# Patient Record
Sex: MALE | Race: WHITE | NOT HISPANIC OR LATINO
[De-identification: names, ages, dates, MRNs, and addresses within clinical notes are randomized per-mention and may not be internally consistent; named-entity substitution may affect disease eponyms.]

---

## 2018-02-26 ENCOUNTER — TRANSCRIPTION ENCOUNTER (OUTPATIENT)
Age: 43
End: 2018-02-26

## 2018-08-30 ENCOUNTER — TRANSCRIPTION ENCOUNTER (OUTPATIENT)
Age: 43
End: 2018-08-30

## 2021-08-29 ENCOUNTER — EMERGENCY (EMERGENCY)
Facility: HOSPITAL | Age: 46
LOS: 0 days | Discharge: HOME | End: 2021-08-29
Attending: EMERGENCY MEDICINE | Admitting: EMERGENCY MEDICINE
Payer: COMMERCIAL

## 2021-08-29 VITALS
DIASTOLIC BLOOD PRESSURE: 71 MMHG | OXYGEN SATURATION: 98 % | RESPIRATION RATE: 16 BRPM | HEART RATE: 102 BPM | SYSTOLIC BLOOD PRESSURE: 107 MMHG | TEMPERATURE: 102 F

## 2021-08-29 DIAGNOSIS — U07.1 COVID-19: ICD-10-CM

## 2021-08-29 DIAGNOSIS — R06.02 SHORTNESS OF BREATH: ICD-10-CM

## 2021-08-29 DIAGNOSIS — R19.7 DIARRHEA, UNSPECIFIED: ICD-10-CM

## 2021-08-29 PROCEDURE — 99284 EMERGENCY DEPT VISIT MOD MDM: CPT

## 2021-08-29 PROCEDURE — 71045 X-RAY EXAM CHEST 1 VIEW: CPT | Mod: 26

## 2021-08-29 RX ORDER — ACETAMINOPHEN 500 MG
975 TABLET ORAL ONCE
Refills: 0 | Status: COMPLETED | OUTPATIENT
Start: 2021-08-29 | End: 2021-08-29

## 2021-08-29 RX ORDER — DEXAMETHASONE 0.5 MG/5ML
6 ELIXIR ORAL ONCE
Refills: 0 | Status: COMPLETED | OUTPATIENT
Start: 2021-08-29 | End: 2021-08-29

## 2021-08-29 RX ADMIN — Medication 975 MILLIGRAM(S): at 16:42

## 2021-08-29 RX ADMIN — Medication 6 MILLIGRAM(S): at 17:52

## 2021-08-29 NOTE — ED PROVIDER NOTE - PATIENT PORTAL LINK FT
You can access the FollowMyHealth Patient Portal offered by [facility] by registering at the following website: [email or web address]. By joining [employer]’s FollowMyHealth portal, you will also be able to view your health information using other applications (apps) compatible with our system.

## 2021-08-29 NOTE — ED PROVIDER NOTE - CLINICAL SUMMARY MEDICAL DECISION MAKING FREE TEXT BOX
CXR consistent with COVID infection. O2 sat maintained at rest and with ambulation. Pt to continue supportive care at home, Monitor oxygen saturation with Pulse oximeter.  Strict return precautions discussed. Pt verbalizes understanding

## 2021-08-29 NOTE — ED PROVIDER NOTE - PHYSICAL EXAMINATION
CONSTITUTIONAL: Well-developed; well-nourished; in no acute distress.   SKIN: warm, dry  HEAD: Normocephalic; atraumatic.  EYES: no conjunctival injection. PERRLA. EOMI.   ENT: No nasal discharge  NECK: Supple  CARD: S1, S2 normal; no murmurs, gallops, or rubs. Regular rate and rhythm.   RESP: No wheezes, rales or rhonchi. Not tachypneic. No increased respiratory effort. Speaking full sentences.  ABD: soft ntnd. BS+ in all 4 quadrants.  EXT: Normal ROM.  No clubbing, cyanosis or edema.   NEURO: Alert, oriented, grossly unremarkable.  PSYCH: Cooperative, appropriate.

## 2021-08-29 NOTE — ED PROVIDER NOTE - CARE PROVIDER_API CALL
James Arguelles  [address] [address]  Phone: [phone]  Fax: [phone]  Follow Up Time: 1-3 Days

## 2021-08-29 NOTE — ED ADULT TRIAGE NOTE - CHIEF COMPLAINT QUOTE
Came in for fever and SOB and fever. COVID positive on August 20th. Pt. states: "I have Covid Pneumonia."

## 2021-08-29 NOTE — ED PROVIDER NOTE - ATTENDING CONTRIBUTION TO CARE
45 yo M presents with fevers on and off last 10 days, + body aches and cough, + SOB.  Pt was seen at [facility] today and told he has pneumonia.  Tested positive for COVID on 8/20.  Pt is not vaccinated, no CP, On exam pt in NAD AAO x 3, non toxic, OP clear, MMM, Lungs cta b/l no wrr, no edema,

## 2021-08-29 NOTE — ED PROVIDER NOTE - OBJECTIVE STATEMENT
47 yo male, no PMHx, presents with shortness of breath, constant since being diagnosed with Covid 8/20, worse with exertion, no alleviating factors, associated with body aches, intermittent diarrhea, and fevers Tmax 103 F, last dose ibuprofen 1 hour prior (600 mg). Patient is concerned because he still has a fever. Tolerating PO. Denies chest pain, abdominal pain, dizziness, headache, nausea, vomiting. 47 yo male, no PMHx, presents with shortness of breath, intermittent, since being diagnosed with Covid 8/20, worse with exertion, no alleviating factors, associated with body aches, intermittent diarrhea, and fevers Tmax 103 F, last dose ibuprofen 1 hour prior (600 mg). Patient is concerned because he still has a fever. Tolerating PO. Denies chest pain, abdominal pain, dizziness, headache, nausea, vomiting.

## 2021-08-29 NOTE — ED PROVIDER NOTE - NS ED ROS FT
GEN:  +fever, +chills, no generalized weakness  NEURO:  no headache, no dizziness  ENT: no sore throat, no runny nose  CV:  no chest pain, no palpitations  RESP:  +sob, no cough  GI:  no nausea, no vomiting, no abdominal pain, +diarrhea  :  no dysuria, no urinary frequency, no hematuria  MSK:  +body aches  SKIN:  no rash, no bruising

## 2021-08-29 NOTE — ED ADULT NURSE NOTE - CHPI ED NUR SYMPTOMS POS
[FreeTextEntry3] : All medical record entries made by the scribe were at my, Dr. Selam Torres's direction and personally dictated by me on 03/27/2019  9:45AM. I have reviewed the chart and agree that the record accurately reflects my personal performance of the history, physical exam, assessment and plan. I have also personally directed, reviewed, and agreed with the chart.\par  FEVER

## 2021-08-31 ENCOUNTER — EMERGENCY (EMERGENCY)
Facility: HOSPITAL | Age: 46
LOS: 0 days | Discharge: HOME | End: 2021-08-31
Attending: EMERGENCY MEDICINE | Admitting: EMERGENCY MEDICINE
Payer: COMMERCIAL

## 2021-08-31 VITALS — OXYGEN SATURATION: 95 %

## 2021-08-31 VITALS
TEMPERATURE: 98 F | SYSTOLIC BLOOD PRESSURE: 112 MMHG | OXYGEN SATURATION: 96 % | DIASTOLIC BLOOD PRESSURE: 78 MMHG | WEIGHT: 279.99 LBS | RESPIRATION RATE: 20 BRPM | HEART RATE: 99 BPM

## 2021-08-31 DIAGNOSIS — R50.9 FEVER, UNSPECIFIED: ICD-10-CM

## 2021-08-31 DIAGNOSIS — R06.02 SHORTNESS OF BREATH: ICD-10-CM

## 2021-08-31 DIAGNOSIS — U07.1 COVID-19: ICD-10-CM

## 2021-08-31 DIAGNOSIS — R05 COUGH: ICD-10-CM

## 2021-08-31 PROCEDURE — 99284 EMERGENCY DEPT VISIT MOD MDM: CPT

## 2021-08-31 RX ORDER — ALBUTEROL 90 UG/1
2 AEROSOL, METERED ORAL
Qty: 1 | Refills: 0
Start: 2021-08-31 | End: 2021-09-29

## 2021-08-31 NOTE — ED PROVIDER NOTE - OBJECTIVE STATEMENT
46 year old male, no past medical history, +COVID 8/20, who presents with shortness of breath. patient recently evaluated in ED 8/28 for similar, has been checking pulse ox @ home, O2 sat 95-97% ORA. patient reports persistent SOB prompting presentation to ed. reports fever, body aches, myalgias, cough and diarrhea. denies chest pain, hemoptysis, abd pain, back pain, leg pain/swelling. patient not vaccinated.

## 2021-08-31 NOTE — ED PROVIDER NOTE - NS ED ROS FT
Review of Systems:  	•	CONSTITUTIONAL: +fever  	•	SKIN: no rash  	•	ENT: no sore throat   	•	RESPIRATORY: +shortness of breath, +cough  	•	CARDIAC: no chest pain, no palpitations  	•	GI: no abd pain, no nausea, no vomiting, +diarrhea.   	•	GENITO-URINARY: no discharge, no dysuria; no hematuria, no increased urinary frequency  	•	MUSCULOSKELETAL: no joint paint, no swelling, no redness  	•	NEUROLOGIC: +weakness, no headache, no paresthesias, no LOC  	•	PSYCH: no anxiety, non suicidal, non homicidal, no hallucination, no depression

## 2021-08-31 NOTE — ED PROVIDER NOTE - NSFOLLOWUPINSTRUCTIONS_ED_ALL_ED_FT
Please follow up with your primary care doctor in 1-3 days  Please be aware of any new or worsening signs or symptoms that should prompt your return to the ER.      Novel Coronavirus (COVID-19)  The Facts  What is a coronavirus?  Coronaviruses are a large family of viruses that cause illnesses ranging from the common cold  to more severe diseases such as Middle East Respiratory Syndrome (MERS) and Severe Acute  Respiratory Syndrome (SARS).  What is Novel Coronavirus (COVID-19)?  COVID-19 is a new strain of Coronavirus that has not been previously identified in humans. COVID-19  was identified in [place], [place], [place] in December 2019 (COVID-19). COVID-19 has  since been identified outside of [place], in a growing number of countries internationally, including  the [place].  Where can I find the most recent information about COVID-19?  The Centers for Disease Control and Prevention (CDC) is closely monitoring the outbreak caused by the  COVID-19. For the latest information about COVID- 19, visit the CDC website at  https://www.cdc.gov/coronavirus/index.html  How are coronaviruses spread?  Coronaviruses can be transmitted from person-to- person, usually after close contact with an infected person,  for example, in a household, workplace, or healthcare setting via droplets that become airborne after a cough  or sneeze by an affected person. These droplets can then infect a nearby person. It is likely transmission also  occurs by touching recently contaminated surfaces.  What are the symptoms of coronavirus infection?  It depends on the virus, but common signs include fever and/or respiratory symptoms such as  cough and shortness of breath. In more severe cases, infection can cause pneumonia, severe acute  respiratory syndrome, kidney failure and even death. Fortunately, most cases of COVID-19 have an  illness no different than the influenza “flu”. With a majority of these patients having mild symptoms  and overall mortality which appears to be not much different than the flu.  Is there a treatment for a COVID-19?  There is no specific treatment for disease caused by COVID-19. However, many of the symptoms can  be treated based on the patient’s clinical condition. Supportive care for infected persons can be highly  effective.  What can I do to protect myself?  Washing your hands, covering your cough, and disinfecting surfaces are the best precautionary  measures. It is also advisable to avoid close contact with anyone showing symptoms of respiratory  illness such as coughing and sneezing. Those with symptoms should wear a surgical mask when  around others.  What can I do to protect those around me?  If you have been identified as someone who may be infected with COVID-19, we recommend you  follow the self-isolation procedures outlined below to protect those around you and limit the spread  of this virus.   March 3, 2020  Recommendations for Patients Advised to Self-Isolate  for Possible COVID-19 Exposure  We recommend the below precautionary steps from now until 14 days from when you  returned from your travel or date of your last known possible contact:  - Do not go to work, school, or public areas. Avoid using public transportation, ride-sharing, or  taxis.  - As much as possible, separate yourself from other people in your home. If you can, you should  stay in a room and away from other people in your home. Also, you should use a separate  bathroom, if available.  - Wear the supplied mask whenever you are around other people.  - If you have a non-urgent medical appointment, please reschedule for a later date. If the  appointment is urgent, please call the healthcare provider and tell them that you are on selfisolation for possible COVID-19. This will help the healthcare provider’s office take steps to keep  other people from getting infected or exposed. If you can reschedule routine appointments, do  so.  - Wash your hands often with soap and water for at least 15 to 20 seconds or clean your hands  with an alcohol-based hand  that contains 60 to 95% alcohol, covering all surfaces of  your hands and rubbing them together until they feel dry. Soap and water should be used  preferentially if hands are visibly dirty.  - Cover your mouth and nose with a tissue when you cough or sneeze. Throw used tissues in a  lined trash can; immediately wash your hands.  - Avoid touching your eyes, nose, and mouth with your hands.  - Avoid sharing personal household items. You should not share dishes, drinking glasses, cups,  eating utensils, towels, or bedding with other people or pets in your home. After using these  items, they should be washed thoroughly with soap and water.  - Clean and disinfect all “high-touch” surfaces every day. High touch surfaces include counters,  tabletops, doorknobs, light switches, remote controls, bathroom fixtures, toilets, phones,  keyboards, tablets, and bedside tables. Also, clean any surfaces that may have blood, stool, or  body fluids on them.       If you develop worsening symptoms:  - If you develop worsening symptoms, such as severe shortness of breath, please call [phone] option #9. They will assist you in determining your next steps.  During your time on self-isolation do the following:  - Work from home if you are able to so.  - Limit social isolation by talking with friends and family on the phone or with face-time  - Talk with friends and relatives who don’t live with you about supporting each other if one  household has to be quarantined. For example, agree to drop groceries or other supplies at the  front door.  - Exercise and spend time outdoors away from others if able to do so.    Why didn’t I get tested for novel coronavirus (COVID-19)?  The number of available tests is very limited so strict rules exist for who is allowed to be tested.  [facility] has been authorized to perform testing and is currently working hard to be  able to start providing the test. Such testing is currently reserved for patients who have had  contact with someone infected with the virus, or those who are very sick a plus those who have  traveled to areas identified by the Centers for Disease Control and Prevention (CD) and will  require hospitalization.  What should I do now?  If you are well enough to be discharged home and are not in a high risk group to have  contracted the COVID-10, you should care for yourself at home exactly like you would if you  have Influenza “flu”. Follow all the standard guidelines about washing your hands, covering  your cough, etc.  You should return to the Emergency Department if you develop worse symptoms, trouble  breathing, chest pain, and/or a fever that doesn’t improve with over the counter  acetaminophen or ibuprofen.

## 2021-08-31 NOTE — ED PROVIDER NOTE - PATIENT PORTAL LINK FT
You can access the FollowMyHealth Patient Portal offered by [facility] by registering at the following website: [email or web address]. By joining [employer]’s FollowMyHealth portal, you will also be able to view your health information using other applications (apps) compatible with our system.

## 2021-08-31 NOTE — ED PROVIDER NOTE - CLINICAL SUMMARY MEDICAL DECISION MAKING FREE TEXT BOX
Pt continues to maintain O 2 sat.  Will give steroid sajan, Albuterol MDI.  Strict return precautions discussed. Pt instructed to return if any worsening symptoms or concerns.  They verbalize understanding.

## 2021-08-31 NOTE — ED ADULT NURSE NOTE - NSIMPLEMENTINTERV_GEN_ALL_ED
Implemented All Universal Safety Interventions:  [place] to call system. Call bell, personal items and telephone within reach. Instruct patient to call for assistance. Room bathroom lighting operational. Non-slip footwear when patient is off stretcher. Physically safe environment: no spills, clutter or unnecessary equipment. Stretcher in lowest position, wheels locked, appropriate side rails in place.

## 2021-08-31 NOTE — ED PROVIDER NOTE - ATTENDING CONTRIBUTION TO CARE
47 yo M no significant PMHx presents still feeling SOB, Pt is COVID positive,  Was seen in ED 2 days ago and given Decadron x 1.  Pt states that he felt so much better after that but symptoms started to return today.  Pt has been monitoring sat at home and it has been greater than 95%, + fevers and body aches.  On exam pt in NAD AAO x 3, speaking full clear sentences, Lungs cta b/l no wrr, abd soft nt nd, no edema, no calf tenderness

## 2021-08-31 NOTE — ED PROVIDER NOTE - PHYSICAL EXAMINATION
CONSTITUTIONAL: Well-developed; well-nourished; in no acute distress, nontoxic appearing  SKIN: skin exam is warm and dry  HEAD: Normocephalic; atraumatic  EYES: PERRL, conjunctiva and sclera clear  ENT: MMM  NECK: SROM intact.  CARD: S1, S2 normal, no murmur  RESP: No wheezes, rales or rhonchi. Good air movement bilaterally. speaking full sentences, no increased WOB   ABD: soft; non-distended; non-tender. No Rebound, No guarding  EXT: Normal ROM.   NEURO: awake, alert, following commands, oriented, grossly unremarkable. No Focal deficits. GCS 15.   PSYCH: Cooperative, appropriate.

## 2021-08-31 NOTE — ED ADULT TRIAGE NOTE - ESI TRIAGE ACUITY LEVEL, MLM
Detail Level: Generalized Detail Level: Zone Detail Level: Detailed Detail Level: Simple Patient Specific Counseling (Will Not Stick From Patient To Patient): EPA started for Dalia. If not covered send to [facility]

## 2021-08-31 NOTE — ED PROVIDER NOTE - CARE PROVIDER_API CALL
Adam Pierce   INTERNAL MEDICINE  [address] [place] [address]  Phone: [phone]  Fax: [phone]  Follow Up Time: 1-3 Days

## 2021-09-09 PROBLEM — Z00.00 ENCOUNTER FOR PREVENTIVE HEALTH EXAMINATION: Status: ACTIVE | Noted: 2021-09-09

## 2021-09-10 ENCOUNTER — TRANSCRIPTION ENCOUNTER (OUTPATIENT)
Age: 46
End: 2021-09-10

## 2021-09-10 ENCOUNTER — INPATIENT (INPATIENT)
Facility: HOSPITAL | Age: 46
LOS: 1 days | Discharge: HOME | End: 2021-09-12
Attending: INTERNAL MEDICINE | Admitting: INTERNAL MEDICINE
Payer: COMMERCIAL

## 2021-09-10 VITALS
HEART RATE: 117 BPM | RESPIRATION RATE: 18 BRPM | SYSTOLIC BLOOD PRESSURE: 114 MMHG | WEIGHT: 300.05 LBS | TEMPERATURE: 98 F | DIASTOLIC BLOOD PRESSURE: 90 MMHG | OXYGEN SATURATION: 100 %

## 2021-09-10 LAB
ALBUMIN SERPL ELPH-MCNC: 4.1 G/DL — SIGNIFICANT CHANGE UP (ref 3.5–5.2)
ALP SERPL-CCNC: 79 U/L — SIGNIFICANT CHANGE UP (ref 30–115)
ALT FLD-CCNC: 51 U/L — HIGH (ref 0–41)
ANION GAP SERPL CALC-SCNC: 13 MMOL/L — SIGNIFICANT CHANGE UP (ref 7–14)
AST SERPL-CCNC: 22 U/L — SIGNIFICANT CHANGE UP (ref 0–41)
BASOPHILS # BLD AUTO: 0 K/UL — SIGNIFICANT CHANGE UP (ref 0–0.2)
BASOPHILS NFR BLD AUTO: 0 % — SIGNIFICANT CHANGE UP (ref 0–1)
BILIRUB SERPL-MCNC: 0.4 MG/DL — SIGNIFICANT CHANGE UP (ref 0.2–1.2)
BUN SERPL-MCNC: 18 MG/DL — SIGNIFICANT CHANGE UP (ref 10–20)
CALCIUM SERPL-MCNC: 10 MG/DL — SIGNIFICANT CHANGE UP (ref 8.5–10.1)
CHLORIDE SERPL-SCNC: 100 MMOL/L — SIGNIFICANT CHANGE UP (ref 98–110)
CO2 SERPL-SCNC: 26 MMOL/L — SIGNIFICANT CHANGE UP (ref 17–32)
CREAT SERPL-MCNC: 1 MG/DL — SIGNIFICANT CHANGE UP (ref 0.7–1.5)
D DIMER BLD IA.RAPID-MCNC: 229 NG/ML DDU — SIGNIFICANT CHANGE UP (ref 0–230)
EOSINOPHIL # BLD AUTO: 0.11 K/UL — SIGNIFICANT CHANGE UP (ref 0–0.7)
EOSINOPHIL NFR BLD AUTO: 0.9 % — SIGNIFICANT CHANGE UP (ref 0–8)
GLUCOSE SERPL-MCNC: 115 MG/DL — HIGH (ref 70–99)
HCT VFR BLD CALC: 45.2 % — SIGNIFICANT CHANGE UP (ref 42–52)
HGB BLD-MCNC: 14.8 G/DL — SIGNIFICANT CHANGE UP (ref 14–18)
LYMPHOCYTES # BLD AUTO: 1.62 K/UL — SIGNIFICANT CHANGE UP (ref 1.2–3.4)
LYMPHOCYTES # BLD AUTO: 13 % — LOW (ref 20.5–51.1)
MAGNESIUM SERPL-MCNC: 2 MG/DL — SIGNIFICANT CHANGE UP (ref 1.8–2.4)
MANUAL SMEAR VERIFICATION: SIGNIFICANT CHANGE UP
MCHC RBC-ENTMCNC: 27.2 PG — SIGNIFICANT CHANGE UP (ref 27–31)
MCHC RBC-ENTMCNC: 32.7 G/DL — SIGNIFICANT CHANGE UP (ref 32–37)
MCV RBC AUTO: 83.1 FL — SIGNIFICANT CHANGE UP (ref 80–94)
METAMYELOCYTES # FLD: 0.9 % — HIGH (ref 0–0)
MONOCYTES # BLD AUTO: 1.29 K/UL — HIGH (ref 0.1–0.6)
MONOCYTES NFR BLD AUTO: 10.4 % — HIGH (ref 1.7–9.3)
NEUTROPHILS # BLD AUTO: 9.31 K/UL — HIGH (ref 1.4–6.5)
NEUTROPHILS NFR BLD AUTO: 74.8 % — SIGNIFICANT CHANGE UP (ref 42.2–75.2)
NT-PROBNP SERPL-SCNC: 7 PG/ML — SIGNIFICANT CHANGE UP (ref 0–300)
PLAT MORPH BLD: NORMAL — SIGNIFICANT CHANGE UP
PLATELET # BLD AUTO: 431 K/UL — HIGH (ref 130–400)
POLYCHROMASIA BLD QL SMEAR: SIGNIFICANT CHANGE UP
POTASSIUM SERPL-MCNC: 4.8 MMOL/L — SIGNIFICANT CHANGE UP (ref 3.5–5)
POTASSIUM SERPL-SCNC: 4.8 MMOL/L — SIGNIFICANT CHANGE UP (ref 3.5–5)
PROT SERPL-MCNC: 7.2 G/DL — SIGNIFICANT CHANGE UP (ref 6–8)
RBC # BLD: 5.44 M/UL — SIGNIFICANT CHANGE UP (ref 4.7–6.1)
RBC # FLD: 13.1 % — SIGNIFICANT CHANGE UP (ref 11.5–14.5)
RBC BLD AUTO: ABNORMAL
SARS-COV-2 RNA SPEC QL NAA+PROBE: DETECTED
SODIUM SERPL-SCNC: 139 MMOL/L — SIGNIFICANT CHANGE UP (ref 135–146)
TROPONIN T SERPL-MCNC: <0.01 NG/ML — SIGNIFICANT CHANGE UP
TROPONIN T SERPL-MCNC: <0.01 NG/ML — SIGNIFICANT CHANGE UP
WBC # BLD: 12.45 K/UL — HIGH (ref 4.8–10.8)
WBC # FLD AUTO: 12.45 K/UL — HIGH (ref 4.8–10.8)

## 2021-09-10 PROCEDURE — 99220: CPT | Mod: 25

## 2021-09-10 PROCEDURE — 93010 ELECTROCARDIOGRAM REPORT: CPT | Mod: 77

## 2021-09-10 PROCEDURE — 71046 X-RAY EXAM CHEST 2 VIEWS: CPT | Mod: 26

## 2021-09-10 PROCEDURE — 93308 TTE F-UP OR LMTD: CPT | Mod: 26

## 2021-09-10 PROCEDURE — 93010 ELECTROCARDIOGRAM REPORT: CPT

## 2021-09-10 RX ORDER — SODIUM CHLORIDE 9 MG/ML
1000 INJECTION INTRAMUSCULAR; INTRAVENOUS; SUBCUTANEOUS ONCE
Refills: 0 | Status: COMPLETED | OUTPATIENT
Start: 2021-09-10 | End: 2021-09-10

## 2021-09-10 RX ORDER — ASPIRIN/CALCIUM CARB/MAGNESIUM 324 MG
325 TABLET ORAL ONCE
Refills: 0 | Status: COMPLETED | OUTPATIENT
Start: 2021-09-10 | End: 2021-09-10

## 2021-09-10 RX ADMIN — SODIUM CHLORIDE 1000 MILLILITER(S): 9 INJECTION INTRAMUSCULAR; INTRAVENOUS; SUBCUTANEOUS at 20:28

## 2021-09-10 NOTE — ED ADULT NURSE REASSESSMENT NOTE - NS ED NURSE REASSESS COMMENT FT1
pt received from previous rn. pt assessed. denies pain or discomfort at this time. vss. cardiac monitoring continued. will continue to monitor.

## 2021-09-10 NOTE — ED PROVIDER NOTE - OBJECTIVE STATEMENT
The patient is a 46 year old male with a history of covid + (8/21) and GERD, c/o chest tightness x 2 days. States this morning pain woke him up from sleep and describes like "elephant on chest" located misternal. Non smoker. No family history of CAD. States he has been checking his HR at home and has been in 110-120s and pulse ox has been in 96-97%. No chest pain in ED.

## 2021-09-10 NOTE — ED PROVIDER NOTE - NS ED ROS FT
Eyes:  No visual changes, eye pain or discharge.  ENMT:  No hearing changes, pain, discharge or infections. No neck pain or stiffness.  Cardiac:  + chest pain. No chest pain with exertion.  Respiratory:  No hemoptysis. No history of asthma or RAD.  GI:  No nausea, vomiting, diarrhea or abdominal pain.  :  No dysuria, frequency or burning.  MS:  No myalgia, muscle weakness, joint pain or back pain.  Neuro:  No headache or weakness.  No LOC.  Skin:  No skin rash.   Endocrine: No history of thyroid disease or diabetes.

## 2021-09-10 NOTE — ED PROVIDER NOTE - PROGRESS NOTE DETAILS
Authored by Dr. Medel:   Echo done, nl function, trace pericardial effusion bedside, images limited by body habitus. ATTENDING NOTE: I personally evaluated the patient. I reviewed the Resident’s note (as assigned above), and agree with the findings and plan except as documented in my note.   ANTHONY RICH  47 y/o M with PMH of recent COVID infection (8/29), was seen in [facility] ED twice since then, was stable for DC, started on Prednisone, presents today for chest tightness. No fever. (+) Occasional cough. No diaphoresis.  vss, nontoxic, well appearing, pink conj, anicteric, MMM, neck supple, decreased breath sounds b/l, otherwise CTAB, RRR, equal radial pulses bilat, abd soft/nt/nd, no cva tend. no calves tend, no edema, no fnd. no rashes  Plan for labs, imaging, reassess. Authored by Dr. Medel:   Pending trop, D-dimer negative. TA: Patient agrees to go to obs.

## 2021-09-10 NOTE — ED ADULT TRIAGE NOTE - CHIEF COMPLAINT QUOTE
Pt sent from urgent care for abnormal EKG (tachycardia) and chest pain x1day. +covid Aug 21. Denies fever or any other symptoms.

## 2021-09-10 NOTE — ED PROVIDER NOTE - CLINICAL SUMMARY MEDICAL DECISION MAKING FREE TEXT BOX
labs and imaging reviewed. cp with atypical features. recent covid. d dimer neg. trop neg. + risk factors.

## 2021-09-10 NOTE — ED PROVIDER NOTE - PHYSICAL EXAMINATION
CONSTITUTIONAL: Well-developed; well-nourished; in no acute distress.   SKIN: warm, dry  HEAD: Normocephalic; atraumatic.  EYES: normal sclera and conjunctiva   ENT: No nasal discharge; airway clear.  NECK: Supple; non tender.  CARD: S1, S2 normal; no murmurs, gallops, or rubs. +tachycardic.   RESP: No wheezes, rales or rhonchi.  ABD: soft ntnd  EXT: Normal ROM.  No clubbing, cyanosis or edema.   LYMPH: No acute cervical adenopathy.  NEURO: Alert, oriented, grossly unremarkable  PSYCH: Cooperative, appropriate.

## 2021-09-10 NOTE — ED CDU PROVIDER INITIAL DAY NOTE - WR ORDER DATE AND TIME 1
10-Sep-2021 13:21 Alert and oriented to person, place, time/situation. normal mood and affect. no apparent risk to self or others.

## 2021-09-10 NOTE — ED ADULT NURSE REASSESSMENT NOTE - NS ED NURSE REASSESS COMMENT FT1
Pt placed under observation. Pt denies pain. Pt VSS, but tachycardic. Pt has no complaints at this time. Pt on cardiac monitor. All safety measures in place. Pt aware of plan of care. Rn will continue to monitor.

## 2021-09-10 NOTE — ED CDU PROVIDER INITIAL DAY NOTE - NS ED ROS FT
Review of Systems    Constitutional: (-) fever, (-) chills  Eyes/ENT: (-) blurry vision, (-) epistaxis, (-) sore throat  Cardiovascular: (+) chest pain, (-) syncope, (+) tachycardia  Respiratory: (-) cough, (+) shortness of breath  Gastrointestinal: (-) pain, (-) nausea, (-) vomiting, (-) diarrhea  Musculoskeletal: (-) neck pain, (-) back pain, (-) body aches  Integumentary: (-) rash, (-) edema  Neurological: (-) headache, (-) altered mental status  Psychiatric: (-) hallucinations  Allergic/Immunologic: (-) pruritus

## 2021-09-10 NOTE — ED CDU PROVIDER INITIAL DAY NOTE - PHYSICAL EXAMINATION
Gen: Alert, NAD, well appearing  Head: NC, AT, PERRL, EOMI, normal lids/conjunctiva  ENT: normal hearing  Neck: +supple, no tenderness/meningismus,  Pulm: Bilateral BS, normal resp effort, no wheeze/stridor/retractions  CV: S1S2, tachy  Abd: soft, NT/ND  Mskel: no edema/erythema/cyanosis  Skin: no rash, warm/dry  Neuro: AAOx3, no sensory/motor deficits

## 2021-09-10 NOTE — ED CDU PROVIDER INITIAL DAY NOTE - OBJECTIVE STATEMENT
46 y M  hx of dyslipidemia, COVID + 8/21 c/o midsternal chest pain described as tightness x 2 days. Pain has been intermittent and woke him up from sleep last night. Pain was "8" and now "3".  +SOB. -120.  No n/v, abdominal pain or leg pains/leg swelling. No family hx of CAD.

## 2021-09-11 DIAGNOSIS — R09.89 OTHER SPECIFIED SYMPTOMS AND SIGNS INVOLVING THE CIRCULATORY AND RESPIRATORY SYSTEMS: ICD-10-CM

## 2021-09-11 PROCEDURE — G1004: CPT

## 2021-09-11 PROCEDURE — 99217: CPT

## 2021-09-11 PROCEDURE — 71275 CT ANGIOGRAPHY CHEST: CPT | Mod: 26,ME

## 2021-09-11 RX ORDER — ENOXAPARIN SODIUM 100 MG/ML
100 INJECTION SUBCUTANEOUS ONCE
Refills: 0 | Status: COMPLETED | OUTPATIENT
Start: 2021-09-11 | End: 2021-09-11

## 2021-09-11 RX ORDER — REMDESIVIR 5 MG/ML
200 INJECTION INTRAVENOUS EVERY 24 HOURS
Refills: 0 | Status: COMPLETED | OUTPATIENT
Start: 2021-09-11 | End: 2021-09-11

## 2021-09-11 RX ORDER — METOPROLOL TARTRATE 50 MG
100 TABLET ORAL ONCE
Refills: 0 | Status: COMPLETED | OUTPATIENT
Start: 2021-09-11 | End: 2021-09-11

## 2021-09-11 RX ORDER — PANTOPRAZOLE SODIUM 20 MG/1
40 TABLET, DELAYED RELEASE ORAL
Refills: 0 | Status: DISCONTINUED | OUTPATIENT
Start: 2021-09-11 | End: 2021-09-12

## 2021-09-11 RX ORDER — ADENOSINE 3 MG/ML
60 INJECTION INTRAVENOUS ONCE
Refills: 0 | Status: DISCONTINUED | OUTPATIENT
Start: 2021-09-11 | End: 2021-09-11

## 2021-09-11 RX ORDER — DEXAMETHASONE 0.5 MG/5ML
6 ELIXIR ORAL DAILY
Refills: 0 | Status: DISCONTINUED | OUTPATIENT
Start: 2021-09-11 | End: 2021-09-12

## 2021-09-11 RX ORDER — SODIUM CHLORIDE 9 MG/ML
1000 INJECTION INTRAMUSCULAR; INTRAVENOUS; SUBCUTANEOUS
Refills: 0 | Status: DISCONTINUED | OUTPATIENT
Start: 2021-09-11 | End: 2021-09-12

## 2021-09-11 RX ORDER — REMDESIVIR 5 MG/ML
INJECTION INTRAVENOUS
Refills: 0 | Status: DISCONTINUED | OUTPATIENT
Start: 2021-09-11 | End: 2021-09-11

## 2021-09-11 RX ORDER — CHLORHEXIDINE GLUCONATE 213 G/1000ML
1 SOLUTION TOPICAL
Refills: 0 | Status: DISCONTINUED | OUTPATIENT
Start: 2021-09-11 | End: 2021-09-12

## 2021-09-11 RX ADMIN — REMDESIVIR 500 MILLIGRAM(S): 5 INJECTION INTRAVENOUS at 17:12

## 2021-09-11 RX ADMIN — Medication 6 MILLIGRAM(S): at 17:12

## 2021-09-11 RX ADMIN — Medication 100 MILLIGRAM(S): at 07:55

## 2021-09-11 RX ADMIN — SODIUM CHLORIDE 125 MILLILITER(S): 9 INJECTION INTRAMUSCULAR; INTRAVENOUS; SUBCUTANEOUS at 12:53

## 2021-09-11 RX ADMIN — Medication 325 MILLIGRAM(S): at 02:02

## 2021-09-11 RX ADMIN — ENOXAPARIN SODIUM 100 MILLIGRAM(S): 100 INJECTION SUBCUTANEOUS at 17:11

## 2021-09-11 NOTE — H&P ADULT - ATTENDING COMMENTS
47 y/o M w/ a hx of recent COVID+ (8/21) and GERD, c/o midsternal chest tightness x 2 days (describes like "elephant on chest" located misternal. Non smoker. No family history of CAD.   States he has been checking his HR at home and has been in 110-120s and pulse ox has been in 96-97%.     PHYSICAL EXAM:  GENERAL: NAD, well-developed.  HEAD:  Atraumatic, Normocephalic.  EYES: EOMI, PERRLA, conjunctiva and sclera clear.  NECK: Supple, No JVD.  CHEST/LUNG: Clear to auscultation bilaterally; No wheeze.  HEART: Regular rate and rhythm; S1 S2.   ABDOMEN: Soft, Nontender, Nondistended; Bowel sounds present.  EXTREMITIES:  2+ Peripheral Pulses, No clubbing, cyanosis, or edema.  PSYCH: AAOx3.  NEUROLOGY: non-focal.  SKIN: No rashes or lesions.    COVID-19 Pneumonia: Unvaccinated  Tested Positive on 8/21  Patient has mild chest pain, improved.   CXR showed bilateral interstitial infiltrates, improved compared to CXR om 8/29  No Hypoxia.   D-dimer mildly elevated  completed course of Prednisone.     Tachycardia   Mild D-dimer elevation, mild chest tightness.   EKG showed sinus tachycardia  DDx: PE, myocarditis,   Repeat Chest CT, if negative patient can be discharge home.,       # GERD - c/w Protonix 40mg Daily;

## 2021-09-11 NOTE — H&P ADULT - ASSESSMENT
47 y/o M w/ a hx of recent COVID+ (8/21) and GERD, c/o midsternal chest tightness x 2 days (describes like "elephant on chest" located misternal. Non smoker. No family history of CAD.   States he has been checking his HR at home and has been in 110-120s and pulse ox has been in 96-97%.   No chest pain in ED.    # COVID PNA - 1st known (+) on 8/21  p/w CP & Sinus Tachycardia  Suspected PE; CTA for PE in ED inconclusive;   ECG(-) for ischemic changes; Tn(-) x2;  - CP resolved;   - ddimer 229(9/10)   - Started on RVD & Decadron 6mg IV Daily  > fu Inflam Markers  > fu Repeat CTA  > fu Duplex  > fu ID consult    # GERD - c/w Protonix 40mg Daily;    Diet: regular  Activity: Ambulate as Tolerated  DVT ppx: Lovenox 100mg Daily  GI ppx: Protonix 40mg Daily  FULL CODE    Dispo: From home;  47 y/o M w/ a hx of recent COVID+ (8/21) and GERD, c/o midsternal chest tightness x 2 days (describes like "elephant on chest" located misternal. Non smoker. No family history of CAD.   States he has been checking his HR at home and has been in 110-120s and pulse ox has been in 96-97%.     # COVID PNA - 1st known (+) on 8/21; Unvaccinated  p/w CP, SOB& Sinus Tachycardia>> All resolved in ED;   Suspected PE; CTA for PE in ED inconclusive;   ECG(-) for ischemic changes; Tn(-) x2;  - Not requiring Oxygen  - ddimer 229(9/10)   - Started on RVD & Decadron 6mg IV Daily  > fu Inflam Markers  > fu Repeat CTA  > fu Duplex  > fu ID consult    # GERD - c/w Protonix 40mg Daily;    Diet: regular  Activity: Ambulate as Tolerated  DVT ppx: Lovenox 100mg Daily  GI ppx: Protonix 40mg Daily  FULL CODE    Dispo: From home;

## 2021-09-11 NOTE — ED CDU PROVIDER SUBSEQUENT DAY NOTE - MEDICAL DECISION MAKING DETAILS
45yo man no significant PMH other than COVID+ since 8/21 was placed in obs for ACS workup after an episode of chest tightness with SOB and palpitations that woke pt from sleep PTA and lasted a couple of hours. He notes that his HR was about 125 while during this episode, and it continued to be high in the ED up to 120s. It improved to 90s with fluid and rest in the ED. EKG and trop negative x 2. On my exam pt is alert and nontoxic appearing, speaking in complete sentences, normal work of breathing, lungs CTA, CVS1S2 borderline tachycardia despite beta blocker that had been given in order to optimize pt for CCTA (pt not a candidate for stress test as still COVID+). Abd soft, no leg edema or tenderness. No chest pain now. Given this episode in the setting of COVID and associated with persistent tachycardia, I am more concerned for PE in this patient than CAD (heart score of 1 for age). I explained my concerns with pt and we agreed to forgo CCTA given 2 negative trops, in favor of PE study. Despite two attempts, scan was nondiagnostic for PE, though it did show moderately severe subacute/acute opacities c/w known COVID. Still concerned for possibility of diffuse smalll PE, which would impact management and possibly future lung function. Spoke with radiology Dr Wright, who felt that scan protocol could be adjusted to optimize images, but that would be 3rd dye load and 3rd radiation dose in 1 day. Given pt still tachycardic at 100, will admit for hydration, pulm/card eval and possible repeat CT or other further workup. Spoke with pt and wife about this plan and they are in agreement, as pt is not back to his baseline. Will cover with lovenox for now.

## 2021-09-11 NOTE — H&P ADULT - NSHPLABSRESULTS_GEN_ALL_CORE
14.8   12.45 )-----------( 431      ( 10 Sep 2021 14:10 )             45.2       09-10    139  |  100  |  18  ----------------------------<  115<H>  4.8   |  26  |  1.0    Ca    10.0      10 Sep 2021 14:10  Mg     2.0     09-10    TPro  7.2  /  Alb  4.1  /  TBili  0.4  /  DBili  x   /  AST  22  /  ALT  51<H>  /  AlkPhos  79  09-10                      CARDIAC MARKERS ( 10 Sep 2021 18:24 )  x     / <0.01 ng/mL / x     / x     / x      CARDIAC MARKERS ( 10 Sep 2021 14:10 )  x     / <0.01 ng/mL / x     / x     / x            CAPILLARY BLOOD GLUCOSE

## 2021-09-11 NOTE — CONSULT NOTE ADULT - ASSESSMENT
47 y/o M w/ a hx of recent COVID+ (8/21) and GERD, c/o midsternal chest tightness x 2 days (describes like "elephant on chest" located midsternal Non smoker. No family history of CAD.   States he has been checking his HR at home and has been in 110-120s and pulse ox has been in 96-97%.   No chest pain in ED.    IMPRESSION;  COVID 19 with moderate illness. SpO2 >94% and not requiring supplemental O2.  Pt is in the late inflammatory response phase ot the illness based on the onset of symptoms.   PE high in DDx as cause of chest tightness  CT could not r/o PE. Scattered opacities  RDV of no benefit    RECOMMENDATIONS;  Target SpO2 92 % to 96 %  d/c RDV  R/o PE  f/u ferritin, CRP, procalcitonin  Steroids of no benefit given that he is 98% RA  Anticoagulation as per team.

## 2021-09-11 NOTE — ED ADULT NURSE REASSESSMENT NOTE - NS ED NURSE REASSESS COMMENT FT1
pt assessed. denies pain or discomfort at this time. vss. cardiac monitoring continued. will continue to monitor.

## 2021-09-11 NOTE — ED CDU PROVIDER SUBSEQUENT DAY NOTE - ATTENDING CONTRIBUTION TO CARE
47yo man no significant PMH other than COVID+ since 8/21 was placed in obs for ACS workup after an episode of chest tightness with SOB and palpitations that woke pt from sleep PTA and lasted a couple of hours. He notes that his HR was about 125 while during this episode, and it continued to be high in the ED up to 120s. It improved to 90s with fluid and rest in the ED. EKG and trop negative x 2. On my exam pt is alert and nontoxic appearing, speaking in complete sentences, normal work of breathing, lungs CTA, CVS1S2 borderline tachycardia despite beta blocker that had been given in order to optimize pt for CCTA (pt not a candidate for stress test as still COVID+). Abd soft, no leg edema or tenderness. No chest pain now. Given this episode in the setting of COVID and associated with persistent tachycardia, I am more concerned for PE in this patient than CAD (heart score of 1 for age). I explained my concerns with pt and we agreed to forgo CCTA given 2 negative trops, in favor of PE study. Despite two attempts, scan was nondiagnostic for PE, though it did show moderately severe subacute/acute opacities c/w known COVID. Still concerned for possibility of diffuse smalll PE, which would impact management and possibly future lung function. Spoke with radiology Dr Wright, who felt that scan protocol could be adjusted to optimize images, but that would be 3rd dye load and 3rd radiation dose in 1 day. Given pt still tachycardic at 100, will admit for hydration, pulm/card eval and possible repeat CT or other further workup. Spoke with pt and wife about this plan and they are in agreement, as pt is not back to his baseline. Will cover with lovenox for now.

## 2021-09-11 NOTE — H&P ADULT - NSHPPHYSICALEXAM_GEN_ALL_CORE
GENERAL: NAD, lying in bed comfortably  HEAD:  Atraumatic, Normocephalic;  EYES: EOMI, PERRLA, conjunctiva and sclera clear;  ENT: Moist mucous membranes;  NECK: Supple, No JVD;  CHEST/LUNG: Clear to auscultation bilaterally; Unlabored respirations  HEART: Regular rate and rhythm; No murmurs, rubs, or gallops  ABDOMEN: Soft, Nontender, Nondistended. No hepatomegaly  EXTREMITIES: No clubbing, cyanosis, or edema;  NERVOUS SYSTEM:  Alert & Oriented X3, speech clear.

## 2021-09-11 NOTE — H&P ADULT - HISTORY OF PRESENT ILLNESS
45 y/o M w/ a hx of recent COVID+ (8/21) and GERD, c/o midsternal chest tightness x 2 days (describes like "elephant on chest" located misternal. Non smoker. No family history of CAD.   States he has been checking his HR at home and has been in 110-120s and pulse ox has been in 96-97%.   No chest pain in ED.    ED:  ·	VS: wnlexcept for   ·	ECG: Sinus Tachy x2; No ischemic changes.  ·	labs: Tn(-) x2; ddimer 229  ·	CXR: Mild peripheral predominant patchy lung opacities. No focal consolidation.  ·	CTA: Nondiagnostic examination for pulmonary embolism due to inadequate opacification of the pulmonary arteries despite multiple attempts. No gross large central pulmonary trunk filling defect seen.      Admit to 3A for repeat CTA

## 2021-09-11 NOTE — ED CDU PROVIDER SUBSEQUENT DAY NOTE - PROGRESS NOTE DETAILS
Despite fluid bolus, pt still remains tachycardic, likely poor candidate for CCTA. Pt to receive Nuclear stress test in morning patient signed out from camila patel, no complaint comfortable, after reevaluation with dr. cedeno , will d/c ccta, informed ct tech norberto for cancellation and ordered ct angio chest r/o pe, patient agrees with plan and management will continue to monitor

## 2021-09-11 NOTE — ED CDU PROVIDER SUBSEQUENT DAY NOTE - HISTORY
Patient is a 46 year old male with a history of covid + (8/21) and GERD, c/o chest tightness x 2 days. States this morning pain woke him up from sleep and describes like "elephant on chest" located misternal. Non smoker. No family history of CAD. Pt states has been tachycardic at home also concerning him. Pt is (2) trop neg with no EKG abnml, pt to receive Nuclear stress test in morning.

## 2021-09-12 ENCOUNTER — TRANSCRIPTION ENCOUNTER (OUTPATIENT)
Age: 46
End: 2021-09-12

## 2021-09-12 VITALS
HEART RATE: 97 BPM | SYSTOLIC BLOOD PRESSURE: 129 MMHG | OXYGEN SATURATION: 97 % | TEMPERATURE: 98 F | DIASTOLIC BLOOD PRESSURE: 86 MMHG | RESPIRATION RATE: 18 BRPM

## 2021-09-12 LAB
ALBUMIN SERPL ELPH-MCNC: 3.8 G/DL — SIGNIFICANT CHANGE UP (ref 3.5–5.2)
ALP SERPL-CCNC: 73 U/L — SIGNIFICANT CHANGE UP (ref 30–115)
ALT FLD-CCNC: 41 U/L — SIGNIFICANT CHANGE UP (ref 0–41)
ANION GAP SERPL CALC-SCNC: 13 MMOL/L — SIGNIFICANT CHANGE UP (ref 7–14)
AST SERPL-CCNC: 16 U/L — SIGNIFICANT CHANGE UP (ref 0–41)
BASOPHILS # BLD AUTO: 0.02 K/UL — SIGNIFICANT CHANGE UP (ref 0–0.2)
BASOPHILS NFR BLD AUTO: 0.3 % — SIGNIFICANT CHANGE UP (ref 0–1)
BILIRUB SERPL-MCNC: 0.3 MG/DL — SIGNIFICANT CHANGE UP (ref 0.2–1.2)
BUN SERPL-MCNC: 17 MG/DL — SIGNIFICANT CHANGE UP (ref 10–20)
CALCIUM SERPL-MCNC: 9.2 MG/DL — SIGNIFICANT CHANGE UP (ref 8.5–10.1)
CHLORIDE SERPL-SCNC: 104 MMOL/L — SIGNIFICANT CHANGE UP (ref 98–110)
CO2 SERPL-SCNC: 21 MMOL/L — SIGNIFICANT CHANGE UP (ref 17–32)
CREAT SERPL-MCNC: 0.9 MG/DL — SIGNIFICANT CHANGE UP (ref 0.7–1.5)
D DIMER BLD IA.RAPID-MCNC: 243 NG/ML DDU — HIGH (ref 0–230)
EOSINOPHIL # BLD AUTO: 0 K/UL — SIGNIFICANT CHANGE UP (ref 0–0.7)
EOSINOPHIL NFR BLD AUTO: 0 % — SIGNIFICANT CHANGE UP (ref 0–8)
GLUCOSE SERPL-MCNC: 129 MG/DL — HIGH (ref 70–99)
HCT VFR BLD CALC: 41.6 % — LOW (ref 42–52)
HGB BLD-MCNC: 13.5 G/DL — LOW (ref 14–18)
IMM GRANULOCYTES NFR BLD AUTO: 1.6 % — HIGH (ref 0.1–0.3)
LYMPHOCYTES # BLD AUTO: 0.77 K/UL — LOW (ref 1.2–3.4)
LYMPHOCYTES # BLD AUTO: 10.3 % — LOW (ref 20.5–51.1)
MAGNESIUM SERPL-MCNC: 2.1 MG/DL — SIGNIFICANT CHANGE UP (ref 1.8–2.4)
MCHC RBC-ENTMCNC: 27.6 PG — SIGNIFICANT CHANGE UP (ref 27–31)
MCHC RBC-ENTMCNC: 32.5 G/DL — SIGNIFICANT CHANGE UP (ref 32–37)
MCV RBC AUTO: 85.1 FL — SIGNIFICANT CHANGE UP (ref 80–94)
MONOCYTES # BLD AUTO: 0.25 K/UL — SIGNIFICANT CHANGE UP (ref 0.1–0.6)
MONOCYTES NFR BLD AUTO: 3.4 % — SIGNIFICANT CHANGE UP (ref 1.7–9.3)
NEUTROPHILS # BLD AUTO: 6.29 K/UL — SIGNIFICANT CHANGE UP (ref 1.4–6.5)
NEUTROPHILS NFR BLD AUTO: 84.4 % — HIGH (ref 42.2–75.2)
NRBC # BLD: 0 /100 WBCS — SIGNIFICANT CHANGE UP (ref 0–0)
PLATELET # BLD AUTO: 339 K/UL — SIGNIFICANT CHANGE UP (ref 130–400)
POTASSIUM SERPL-MCNC: 4.5 MMOL/L — SIGNIFICANT CHANGE UP (ref 3.5–5)
POTASSIUM SERPL-SCNC: 4.5 MMOL/L — SIGNIFICANT CHANGE UP (ref 3.5–5)
PROT SERPL-MCNC: 6.7 G/DL — SIGNIFICANT CHANGE UP (ref 6–8)
RBC # BLD: 4.89 M/UL — SIGNIFICANT CHANGE UP (ref 4.7–6.1)
RBC # FLD: 12.5 % — SIGNIFICANT CHANGE UP (ref 11.5–14.5)
SODIUM SERPL-SCNC: 138 MMOL/L — SIGNIFICANT CHANGE UP (ref 135–146)
WBC # BLD: 7.45 K/UL — SIGNIFICANT CHANGE UP (ref 4.8–10.8)
WBC # FLD AUTO: 7.45 K/UL — SIGNIFICANT CHANGE UP (ref 4.8–10.8)

## 2021-09-12 PROCEDURE — 71045 X-RAY EXAM CHEST 1 VIEW: CPT | Mod: 26

## 2021-09-12 PROCEDURE — 71275 CT ANGIOGRAPHY CHEST: CPT | Mod: 26

## 2021-09-12 PROCEDURE — 99223 1ST HOSP IP/OBS HIGH 75: CPT

## 2021-09-12 RX ORDER — ENOXAPARIN SODIUM 100 MG/ML
130 INJECTION SUBCUTANEOUS
Refills: 0 | Status: DISCONTINUED | OUTPATIENT
Start: 2021-09-12 | End: 2021-09-12

## 2021-09-12 RX ORDER — METOPROLOL TARTRATE 50 MG
1 TABLET ORAL
Qty: 60 | Refills: 0
Start: 2021-09-12 | End: 2021-10-11

## 2021-09-12 RX ADMIN — ENOXAPARIN SODIUM 130 MILLIGRAM(S): 100 INJECTION SUBCUTANEOUS at 12:06

## 2021-09-12 RX ADMIN — Medication 6 MILLIGRAM(S): at 05:04

## 2021-09-12 RX ADMIN — PANTOPRAZOLE SODIUM 40 MILLIGRAM(S): 20 TABLET, DELAYED RELEASE ORAL at 05:04

## 2021-09-12 NOTE — PROGRESS NOTE ADULT - SUBJECTIVE AND OBJECTIVE BOX
Patient is a 46y old  Male who presents with a chief complaint of CP & SOB (12 Sep 2021 08:07)      HPI:  45 y/o M w/ a hx of recent COVID+ (8/21) and GERD, c/o midsternal chest tightness x 2 days (describes like "elephant on chest" located misternal. Non smoker. No family history of CAD.   States he has been checking his HR at home and has been in 110-120s and pulse ox has been in 96-97%.   No chest pain in ED.    ED:  ·	VS: wnlexcept for   ·	ECG: Sinus Tachy x2; No ischemic changes.  ·	labs: Tn(-) x2; ddimer 229  ·	CXR: Mild peripheral predominant patchy lung opacities. No focal consolidation.  ·	CTA: Nondiagnostic examination for pulmonary embolism due to inadequate opacification of the pulmonary arteries despite multiple attempts. No gross large central pulmonary trunk filling defect seen.      Admit to 3A for repeat CTA   (11 Sep 2021 13:19)      PAST MEDICAL & SURGICAL HISTORY:  Dyslipidemia        Home Medications:      .  Tobacco use:   EtOH use:  Illicit drug use:    Living situation:    Allergies:  No Known Allergies      FAMILY HISTORY:  No pertinent family history in first degree relatives        Hospital Medications:  chlorhexidine 4% Liquid 1 Application(s) Topical <User Schedule>  dexAMETHasone     Tablet 6 milliGRAM(s) Oral daily  enoxaparin Injectable 130 milliGRAM(s) SubCutaneous two times a day  pantoprazole    Tablet 40 milliGRAM(s) Oral before breakfast      Vital Signs Last 24 Hrs  T(C): 37.1 (12 Sep 2021 05:03), Max: 37.1 (12 Sep 2021 05:03)  T(F): 98.7 (12 Sep 2021 05:03), Max: 98.7 (12 Sep 2021 05:03)  HR: 90 (12 Sep 2021 05:03) (90 - 106)  BP: 141/95 (12 Sep 2021 05:03) (118/77 - 141/95)  BP(mean): 99 (11 Sep 2021 16:00) (93 - 99)  RR: 18 (12 Sep 2021 05:03) (18 - 18)  SpO2: 96% (12 Sep 2021 05:03) (95% - 98%)    I&O's Summary    11 Sep 2021 07:01  -  12 Sep 2021 07:00  --------------------------------------------------------  IN: 875 mL / OUT: 0 mL / NET: 875 mL        LABS:                        13.5   7.45  )-----------( 339      ( 12 Sep 2021 06:30 )             41.6       09-12    138  |  104  |  17  ----------------------------<  129<H>  4.5   |  21  |  0.9    Ca    9.2      12 Sep 2021 06:30  Mg     2.1     09-12    TPro  6.7  /  Alb  3.8  /  TBili  0.3  /  DBili  x   /  AST  16  /  ALT  41  /  AlkPhos  73  09-12      CARDIAC MARKERS ( 10 Sep 2021 18:24 )  x     / <0.01 ng/mL / x     / x     / x      CARDIAC MARKERS ( 10 Sep 2021 14:10 )  x     / <0.01 ng/mL / x     / x     / x                PHYSICAL EXAM:  GENERAL: NAD, lying in bed comfortably  HEAD:  Atraumatic, Normocephalic  EYES: conjunctiva and sclera clear  ENT: Moist mucous membranes  NECK: Supple, No JVD  CHEST/LUNG: Clear to auscultation bilaterally;   HEART: Regular rate and rhythm; No murmurs, rubs, or gallops  ABDOMEN: Bowel sounds present; Soft, Nontender, Nondistended.   EXTREMITIES:  No clubbing, cyanosis, or edema  NERVOUS SYSTEM:  Alert & Oriented X3, speech clear. No deficits           
  DIANELYS RICH  46y, Male    All available historical data reviewed    OVERNIGHT EVENTS:  99 % RA  none    ROS:  General: Denies rigors, nightsweats  HEENT: Denies headache, rhinorrhea, sore throat, eye pain  CV: Denies CP, palpitations  PULM: Denies wheezing, hemoptysis  GI: Denies hematemesis, hematochezia, melena  : Denies discharge, hematuria  MSK: Denies arthralgias, myalgias  SKIN: Denies rash, lesions  NEURO: Denies paresthesias, weakness  PSYCH: Denies depression, anxiety    VITALS:  T(F): 98.7, Max: 98.7 (09-12-21 @ 05:03)  HR: 90  BP: 141/95  RR: 18Vital Signs Last 24 Hrs  T(C): 37.1 (12 Sep 2021 05:03), Max: 37.1 (12 Sep 2021 05:03)  T(F): 98.7 (12 Sep 2021 05:03), Max: 98.7 (12 Sep 2021 05:03)  HR: 90 (12 Sep 2021 05:03) (90 - 106)  BP: 141/95 (12 Sep 2021 05:03) (118/77 - 141/95)  BP(mean): 99 (11 Sep 2021 16:00) (93 - 99)  RR: 18 (12 Sep 2021 05:03) (18 - 18)  SpO2: 96% (12 Sep 2021 05:03) (95% - 98%)    TESTS & MEASUREMENTS:                        13.5   7.45  )-----------( 339      ( 12 Sep 2021 06:30 )             41.6     09-12    138  |  104  |  17  ----------------------------<  129<H>  4.5   |  21  |  0.9    Ca    9.2      12 Sep 2021 06:30  Mg     2.1     09-12    TPro  6.7  /  Alb  3.8  /  TBili  0.3  /  DBili  x   /  AST  16  /  ALT  41  /  AlkPhos  73  09-12    LIVER FUNCTIONS - ( 12 Sep 2021 06:30 )  Alb: 3.8 g/dL / Pro: 6.7 g/dL / ALK PHOS: 73 U/L / ALT: 41 U/L / AST: 16 U/L / GGT: x                   RADIOLOGY & ADDITIONAL TESTS:  Personal review of radiological diagnostics performed  Echo and EKG results noted when applicable.     MEDICATIONS:  chlorhexidine 4% Liquid 1 Application(s) Topical <User Schedule>  dexAMETHasone     Tablet 6 milliGRAM(s) Oral daily  enoxaparin Injectable 130 milliGRAM(s) SubCutaneous two times a day  pantoprazole    Tablet 40 milliGRAM(s) Oral before breakfast      ANTIBIOTICS:

## 2021-09-12 NOTE — DISCHARGE NOTE PROVIDER - NSDCHOSPICE_GEN_A_CORE
Mother gave patient her prescription medications. Mother did not inform staff she was giving meds. Educated mother on drug administration in hospital setting.  .  
Ordered pt lunch  
Patient placed in line for teleDEC assessment  
Pt continues to sleep in room. Equal rise and fall of chest. 1:1 present.  
Pt mom uncomfortable with male sitter;  When mom leaves, male sitter will place chair in doorway.  Mom and pt comfortable with this.  
Pt sleeping in room on bed. 1:1 present at bedside. Equal rise and fall of chest.  
Video Observation initiated, patient informed.   
Video Observation initiated, patient informed.     Hyacinth Lizama RN    
No

## 2021-09-12 NOTE — DISCHARGE NOTE PROVIDER - NSDCCPCAREPLAN_GEN_ALL_CORE_FT
PRINCIPAL DISCHARGE DIAGNOSIS  Diagnosis: Shortness of breath  Assessment and Plan of Treatment: improved

## 2021-09-12 NOTE — CHART NOTE - NSCHARTNOTEFT_GEN_A_CORE
To whom it may concern,    Todd Luu was a patient at [facility] where he was treated for COVID pneumonia. It is recommended that he waits 90 days before receiving the COVID vaccine. He can receive the vaccine starting 12/12/21. Thank you.    Derek Espana MD

## 2021-09-12 NOTE — PROGRESS NOTE ADULT - ASSESSMENT
47 y/o M w/ a hx of recent COVID+ (8/21) and GERD, c/o midsternal chest tightness x 2 days (describes like "elephant on chest" located misternal. Non smoker. No family history of CAD.   States he has been checking his HR at home and has been in 110-120s and pulse ox has been in 96-97%.     # COVID PNA - 1st known (+) on 8/21; Unvaccinated  p/w CP, SOB& Sinus Tachycardia>> All resolved in ED;   Suspected PE; CTA for PE in ED inconclusive;   ECG(-) for ischemic changes; Tn(-) x2;  - Not requiring Oxygen  - ddimer 229(9/10)   - steroids and RDV of no benefit   - started on therapeutic lovenox for suspected PE   > fu Inflam Markers  > fu Repeat CTA  > fu Duplex  > ID consult appreciated     # GERD - c/w Protonix 40mg Daily;    Diet: regular  Activity: Ambulate as Tolerated  DVT ppx: Lovenox 130mg BID  GI ppx: Protonix 40mg Daily  FULL CODE    Dispo: From home;     
Assessment and Recommendation:   · Assessment	  45 y/o M w/ a hx of recent COVID+ (8/21) and GERD, c/o midsternal chest tightness x 2 days (describes like "elephant on chest" located midsternal Non smoker. No family history of CAD.   States he has been checking his HR at home and has been in 110-120s and pulse ox has been in 96-97%.   No chest pain in ED.    IMPRESSION;  Clinically asymptomatic  COVID 19 with moderate illness. SpO2 >94% and not requiring supplemental O2.  Pt is in the late inflammatory response phase ot the illness based on the onset of symptoms.   PE high in DDx as cause of chest tightness  CT could not r/o PE. Scattered opacities  RDV of no benefit    RECOMMENDATIONS;  Target SpO2 92 % to 96 %  R/o PE  Steroids of no benefit given that he is 98% RA  Anticoagulation as per team.   recall prn please

## 2021-09-12 NOTE — DISCHARGE NOTE NURSING/CASE MANAGEMENT/SOCIAL WORK - PATIENT PORTAL LINK FT
You can access the FollowMyHealth Patient Portal offered by [facility] by registering at the following website: [email or web address]. By joining [facility]’s FollowMyHealth portal, you will also be able to view your health information using other applications (apps) compatible with our system.

## 2021-09-12 NOTE — DISCHARGE NOTE PROVIDER - CARE PROVIDER_API CALL
James Arguelles  [address] [address]  Phone: [phone]  Fax: [phone]  Follow Up Time: 2 weeks

## 2021-09-12 NOTE — DISCHARGE NOTE PROVIDER - HOSPITAL COURSE
47 y/o M w/ a hx of recent COVID+ (8/21) and GERD, c/o midsternal chest tightness x 2 days (describes like "elephant on chest" located misternal. Non smoker. No family history of CAD.   States he has been checking his HR at home and has been in 110-120s and pulse ox has been in 96-97%.     PHYSICAL EXAM:  GENERAL: NAD, well-developed.  HEAD:  Atraumatic, Normocephalic.  EYES: EOMI, PERRLA, conjunctiva and sclera clear.  NECK: Supple, No JVD.  CHEST/LUNG: Clear to auscultation bilaterally; No wheeze.  HEART: Regular rate and rhythm; S1 S2.   ABDOMEN: Soft, Nontender, Nondistended; Bowel sounds present.  EXTREMITIES:  2+ Peripheral Pulses, No clubbing, cyanosis, or edema.  PSYCH: AAOx3.  NEUROLOGY: non-focal.  SKIN: No rashes or lesions.    COVID-19 Pneumonia: Unvaccinated  Tested Positive on 8/21  Patient has mild chest pain, improved.   CXR showed bilateral interstitial infiltrates, improved compared to CXR om 8/29  No Hypoxia.   D-dimer mildly elevated  completed course of Prednisone.     Tachycardia   Mild D-dimer elevation, mild chest tightness.   EKG showed sinus tachycardia  DDx: PE, myocarditis,   Repeat Chest CT negative for PE  Start on Metoprolol 25mg BID.   patient can be discharge home.,

## 2021-09-12 NOTE — DISCHARGE NOTE NURSING/CASE MANAGEMENT/SOCIAL WORK - NSDCPEFALRISK_GEN_ALL_CORE
For information on Fall & injury Prevention, visit [email or web address]

## 2021-09-13 LAB
COVID-19 SPIKE DOMAIN AB INTERP: POSITIVE
COVID-19 SPIKE DOMAIN ANTIBODY RESULT: 198 U/ML — HIGH
FERRITIN SERPL-MCNC: 1079 NG/ML — HIGH (ref 30–400)
PROCALCITONIN SERPL-MCNC: 0.03 NG/ML — SIGNIFICANT CHANGE UP (ref 0.02–0.1)
SARS-COV-2 IGG+IGM SERPL QL IA: 198 U/ML — HIGH
SARS-COV-2 IGG+IGM SERPL QL IA: POSITIVE

## 2021-09-17 DIAGNOSIS — R07.9 CHEST PAIN, UNSPECIFIED: ICD-10-CM

## 2021-09-17 DIAGNOSIS — K21.9 GASTRO-ESOPHAGEAL REFLUX DISEASE WITHOUT ESOPHAGITIS: ICD-10-CM

## 2021-09-17 DIAGNOSIS — J12.82 PNEUMONIA DUE TO CORONAVIRUS DISEASE 2019: ICD-10-CM

## 2021-09-17 DIAGNOSIS — R00.0 TACHYCARDIA, UNSPECIFIED: ICD-10-CM

## 2021-09-17 DIAGNOSIS — U07.1 COVID-19: ICD-10-CM

## 2021-10-08 ENCOUNTER — APPOINTMENT (OUTPATIENT)
Dept: INTERNAL MEDICINE | Facility: CLINIC | Age: 46
End: 2021-10-08

## 2022-06-01 NOTE — ED ADULT NURSE NOTE - OBJECTIVE STATEMENT
Pt sent from urgent care for abnormal EKG (tachycardia) and chest pain x1day. +covid Aug 21. Denies fever or any other symptoms. 01-Jun-2022

## 2022-09-30 ENCOUNTER — NON-APPOINTMENT (OUTPATIENT)
Age: 47
End: 2022-09-30

## 2023-01-01 ENCOUNTER — NON-APPOINTMENT (OUTPATIENT)
Age: 48
End: 2023-01-01

## 2024-03-22 ENCOUNTER — APPOINTMENT (OUTPATIENT)
Dept: ORTHOPEDIC SURGERY | Facility: CLINIC | Age: 49
End: 2024-03-22
Payer: COMMERCIAL

## 2024-03-22 ENCOUNTER — NON-APPOINTMENT (OUTPATIENT)
Age: 49
End: 2024-03-22

## 2024-03-22 VITALS — BODY MASS INDEX: 39.76 KG/M2 | WEIGHT: 300 LBS | HEIGHT: 73 IN

## 2024-03-22 DIAGNOSIS — M72.2 PLANTAR FASCIAL FIBROMATOSIS: ICD-10-CM

## 2024-03-22 PROBLEM — E78.5 HYPERLIPIDEMIA, UNSPECIFIED: Chronic | Status: ACTIVE | Noted: 2021-09-10

## 2024-03-22 PROCEDURE — 99203 OFFICE O/P NEW LOW 30 MIN: CPT

## 2024-03-22 PROCEDURE — 73630 X-RAY EXAM OF FOOT: CPT | Mod: LT

## 2024-03-22 RX ORDER — METHYLPREDNISOLONE 4 MG/1
4 TABLET ORAL
Qty: 1 | Refills: 0 | Status: ACTIVE | COMMUNITY
Start: 2024-03-22 | End: 1900-01-01

## 2024-03-22 NOTE — DISCUSSION/SUMMARY
[de-identified] : My clinical suspicion is high for plantars fasciitis secondary to spurring at the heel/calcaneus of the right foot.  Patient reports that a prednisone taper/Medrol Dosepak has provided him relief in the past.  Confirmed no contraindications.  Medrol Dosepak sent to patient's pharmacy to be taken as directed.  Confirmed he is not diabetic.  Encourage gentle range of motion activity modification.  He can supplement with anti-inflammatory medication as needed as well.  I will give the patient follow-up with Dr. Robert in 2 weeks for potential cortisone injections into his heel spurring/plantar fasciitis.  Red flag symptoms discussed. All questions and concerns addressed to patient's satisfaction. Patient expresses full understanding of treatment plan.

## 2024-03-22 NOTE — HISTORY OF PRESENT ILLNESS
[de-identified] : 49-year-old female here for evaluation of pain on the plantar aspect of his left foot.  He has a history of a heel spur and reports episodes of pain over the last few years.  He was previously treated with a prednisone taper along with cortisone injections into the heel with relief.  Denies any recent trauma or falls.  Denies any numbness or tingling.

## 2024-03-22 NOTE — IMAGING
[de-identified] : Physical examination of the left foot: Mild swelling appreciated throughout.  No ecchymosis or erythema appreciated.  Skin is intact.  Tender to palpation on the heel and plantar aspect of the left foot.  Good range of motion.  Good strength throughout.  Sensorimotor intact distally.  Neuro vas intact.  Antalgic gait.  X-rays left foot taken in the office today: Significant spurring appreciated at the heel of the left foot.  No subluxations or dislocations.  No acute fractures.

## 2024-04-08 ENCOUNTER — APPOINTMENT (OUTPATIENT)
Dept: ORTHOPEDIC SURGERY | Facility: CLINIC | Age: 49
End: 2024-04-08

## 2024-09-10 ENCOUNTER — APPOINTMENT (OUTPATIENT)
Dept: SURGERY | Facility: CLINIC | Age: 49
End: 2024-09-10
Payer: COMMERCIAL

## 2024-09-10 VITALS — HEIGHT: 73 IN | WEIGHT: 300 LBS | BODY MASS INDEX: 39.76 KG/M2

## 2024-09-10 DIAGNOSIS — K43.6 OTHER AND UNSPECIFIED VENTRAL HERNIA WITH OBSTRUCTION, W/OUT GANGRENE: ICD-10-CM

## 2024-09-10 DIAGNOSIS — E66.01 MORBID (SEVERE) OBESITY DUE TO EXCESS CALORIES: ICD-10-CM

## 2024-09-10 PROCEDURE — 99203 OFFICE O/P NEW LOW 30 MIN: CPT

## 2024-09-10 NOTE — PHYSICAL EXAM
[JVD] : no jugular venous distention  [Normal Breath Sounds] : Normal breath sounds [No Rash or Lesion] : No rash or lesion [Alert] : alert [Calm] : calm [de-identified] : Overweight [de-identified] : Normal [de-identified] : Moderately protuberant abdomen; moderate rectus diastasis [de-identified] : Incarcerated ventral hernia

## 2024-09-10 NOTE — ASSESSMENT
[FreeTextEntry1] : Todd is a pleasant 49-year-old   at [employer] [facility] [employer] with no significant past medical history who presents to the office with pain and swelling in the mid abdomen suspicious for hernia.  He used to do heavy lifting and strenuous activity at work on a regular basis at his previous employment.  He occasionally does moderately heavy lifting and strenuous activity around his home.  Several weeks ago he actually had significant pain and enlargement of this hernia sac along with some erythema which resolved spontaneously over 24 hours.  Physical examination demonstrates a tender baseball sized bulge several fingerbreadths above and slightly left lateral to the midline which is quite firm and not reducible consistent with an incarcerated ventral hernia warranting surgical repair.  There is no evidence of strangulation, and the patient denies any symptoms of obstruction.  This hernia is complicated by a moderate rectus diastasis likely related to his excess abdominal weight and moderately protuberant abdomen.  His current BMI is 40.  I explained the pros and cons of surgery, as well as all risks, benefits, indications and alternatives of the procedure and the patient understood and agreed.  Todd was scheduled for the repair of his incarcerated ventral hernia with mesh on Friday, September 27, 2024 under LOCAL with IV SEDATION at [facility] with presurgical testing waived.  He was encouraged to avoid heavy lifting and strenuous activity in the interim, of course.  We also discussed the importance of calorie restriction, healthy eating, and moderate aerobic exercise with regard to weight loss, hernia recurrence and his overall health.

## 2024-09-10 NOTE — CONSULT LETTER
[Dear  ___] : Dear  [unfilled], [Courtesy Letter:] : I had the pleasure of seeing your patient, [unfilled], in my office today. [Please see my note below.] : Please see my note below. [Consult Closing:] : Thank you very much for allowing me to participate in the care of this patient.  If you have any questions, please do not hesitate to contact me. [FreeTextEntry3] : Respectfully,  Prasanna Solano M.D., FACS

## 2024-09-26 ENCOUNTER — NON-APPOINTMENT (OUTPATIENT)
Age: 49
End: 2024-09-26

## 2024-09-26 RX ORDER — TRAMADOL HYDROCHLORIDE 50 MG/1
50 TABLET, COATED ORAL
Qty: 20 | Refills: 0 | Status: ACTIVE | COMMUNITY
Start: 2024-09-26 | End: 1900-01-01

## 2024-09-27 ENCOUNTER — APPOINTMENT (OUTPATIENT)
Dept: SURGERY | Facility: AMBULATORY SURGERY CENTER | Age: 49
End: 2024-09-27
Payer: COMMERCIAL

## 2024-09-27 ENCOUNTER — TRANSCRIPTION ENCOUNTER (OUTPATIENT)
Age: 49
End: 2024-09-27

## 2024-09-27 ENCOUNTER — RESULT REVIEW (OUTPATIENT)
Age: 49
End: 2024-09-27

## 2024-09-27 ENCOUNTER — OUTPATIENT (OUTPATIENT)
Dept: OUTPATIENT SERVICES | Facility: HOSPITAL | Age: 49
LOS: 1 days | Discharge: HOME | End: 2024-09-27
Payer: COMMERCIAL

## 2024-09-27 VITALS — HEART RATE: 52 BPM | DIASTOLIC BLOOD PRESSURE: 88 MMHG | SYSTOLIC BLOOD PRESSURE: 142 MMHG | RESPIRATION RATE: 12 BRPM

## 2024-09-27 VITALS
SYSTOLIC BLOOD PRESSURE: 148 MMHG | TEMPERATURE: 98 F | WEIGHT: 300.05 LBS | HEIGHT: 73 IN | HEART RATE: 65 BPM | RESPIRATION RATE: 18 BRPM | DIASTOLIC BLOOD PRESSURE: 90 MMHG | OXYGEN SATURATION: 98 %

## 2024-09-27 DIAGNOSIS — K43.6 OTHER AND UNSPECIFIED VENTRAL HERNIA WITH OBSTRUCTION, WITHOUT GANGRENE: ICD-10-CM

## 2024-09-27 DIAGNOSIS — Z98.52 VASECTOMY STATUS: Chronic | ICD-10-CM

## 2024-09-27 PROCEDURE — 88302 TISSUE EXAM BY PATHOLOGIST: CPT

## 2024-09-27 PROCEDURE — C1781: CPT

## 2024-09-27 PROCEDURE — 88302 TISSUE EXAM BY PATHOLOGIST: CPT | Mod: 26

## 2024-09-27 PROCEDURE — 49594 RPR AA HRN 1ST 3-10 NCR/STRN: CPT

## 2024-09-27 RX ORDER — HYDROMORPHONE HYDROCHLORIDE 2 MG/1
0.5 TABLET ORAL
Refills: 0 | Status: DISCONTINUED | OUTPATIENT
Start: 2024-09-27 | End: 2024-09-27

## 2024-09-27 RX ORDER — ACETAMINOPHEN 325 MG/1
1000 TABLET ORAL ONCE
Refills: 0 | Status: DISCONTINUED | OUTPATIENT
Start: 2024-09-27 | End: 2024-09-27

## 2024-09-27 RX ORDER — OXYCODONE HYDROCHLORIDE 5 MG/1
5 TABLET ORAL ONCE
Refills: 0 | Status: DISCONTINUED | OUTPATIENT
Start: 2024-09-27 | End: 2024-09-27

## 2024-09-27 RX ORDER — ONDANSETRON 2 MG/ML
4 INJECTION, SOLUTION INTRAMUSCULAR; INTRAVENOUS ONCE
Refills: 0 | Status: DISCONTINUED | OUTPATIENT
Start: 2024-09-27 | End: 2024-09-27

## 2024-09-27 NOTE — BRIEF OPERATIVE NOTE - NSICDXBRIEFPROCEDURE_GEN_ALL_CORE_FT
PROCEDURES:  Repair of anterior abdominal hernia(s) (ie, epigastric, incisional, ventral, umbilical, Spigelian), 27-Sep-2024 14:15:35  Prasanna Solano

## 2024-09-27 NOTE — ASU DISCHARGE PLAN (ADULT/PEDIATRIC) - COMMENTS
- You will see [facility] Physician Assistant, Preeti Schmid, at your postoperative visit noted above.

## 2024-09-27 NOTE — ASU DISCHARGE PLAN (ADULT/PEDIATRIC) - CARE PROVIDER_API CALL
Prasanna Solano  Surgery  [address] [place] [id number]  Phone: [phone]  Fax: [phone]  Scheduled Appointment: 10/07/2024 11:20 AM

## 2024-09-27 NOTE — ASU DISCHARGE PLAN (ADULT/PEDIATRIC) - ASU DC SPECIAL INSTRUCTIONSFT
Diet    Eat light on the day of surgery. Nausea and vomiting can occur after anesthesia,   but usually resolve within 24 hours.  Resume normal diet the following day.      Activity    Rest!  No heavy lifting or strenuous activity.    Medications    Ibuprofen (Advil, Motrin), Naproxen (Aleve) and/or Extra-Strength Tylenol for pain.  Tramadol for severe pain only.  Your prescription was sent electronically to your pharmacy.  Remember, Tramadol is a strong narcotic pain reliever which can cause drowsiness, upset stomach and constipation.  It should always be taken with food.  You can use stool softener (Mineral Oil) or laxative (MiraLax or Dulcolax) if constipated.  An antibiotic is given during surgery.  No antibiotic needed at home.  Resume all previous medications.  Resume blood thinners the day after surgery unless told otherwise.    Wound Care    Leave surgical dressing in place.  May shower (dressing is waterproof.)  No pool, ocean, lake, hot-tub or bath for 3 weeks. If you were given an abdominal binder, please wear it as much as possible (day & night) for 2 weeks, but you must remove it for showers.  Ice packs to the area intermittently for several days help with pain and swelling.  Bruising (“black and blue”) is common.  Treatment is…Ice & Rest.       - You will see [facility] Physician Assistant, Preeti Schmid, at your postoperative visit noted below.

## 2024-10-01 LAB — SURGICAL PATHOLOGY STUDY: SIGNIFICANT CHANGE UP

## 2024-10-03 DIAGNOSIS — E78.5 HYPERLIPIDEMIA, UNSPECIFIED: ICD-10-CM

## 2024-10-03 DIAGNOSIS — K43.6 OTHER AND UNSPECIFIED VENTRAL HERNIA WITH OBSTRUCTION, WITHOUT GANGRENE: ICD-10-CM

## 2024-10-03 DIAGNOSIS — E66.9 OBESITY, UNSPECIFIED: ICD-10-CM

## 2024-10-07 ENCOUNTER — APPOINTMENT (OUTPATIENT)
Dept: SURGERY | Facility: CLINIC | Age: 49
End: 2024-10-07
Payer: COMMERCIAL

## 2024-10-07 DIAGNOSIS — E66.01 MORBID (SEVERE) OBESITY DUE TO EXCESS CALORIES: ICD-10-CM

## 2024-10-07 DIAGNOSIS — K43.6 OTHER AND UNSPECIFIED VENTRAL HERNIA WITH OBSTRUCTION, W/OUT GANGRENE: ICD-10-CM

## 2024-10-07 PROCEDURE — 99213 OFFICE O/P EST LOW 20 MIN: CPT

## 2025-06-07 ENCOUNTER — EMERGENCY (EMERGENCY)
Facility: HOSPITAL | Age: 50
LOS: 0 days | Discharge: HOME | End: 2025-06-07
Attending: EMERGENCY MEDICINE
Payer: COMMERCIAL

## 2025-06-07 VITALS
DIASTOLIC BLOOD PRESSURE: 99 MMHG | RESPIRATION RATE: 16 BRPM | HEART RATE: 85 BPM | OXYGEN SATURATION: 99 % | SYSTOLIC BLOOD PRESSURE: 156 MMHG | HEIGHT: 72 IN | WEIGHT: 315 LBS | TEMPERATURE: 98 F

## 2025-06-07 DIAGNOSIS — M10.9 GOUT, UNSPECIFIED: ICD-10-CM

## 2025-06-07 DIAGNOSIS — R60.0 LOCALIZED EDEMA: ICD-10-CM

## 2025-06-07 DIAGNOSIS — M79.89 OTHER SPECIFIED SOFT TISSUE DISORDERS: ICD-10-CM

## 2025-06-07 DIAGNOSIS — Z98.52 VASECTOMY STATUS: Chronic | ICD-10-CM

## 2025-06-07 DIAGNOSIS — M79.672 PAIN IN LEFT FOOT: ICD-10-CM

## 2025-06-07 PROBLEM — E66.9 OBESITY, UNSPECIFIED: Chronic | Status: ACTIVE | Noted: 2024-09-27

## 2025-06-07 PROCEDURE — 93970 EXTREMITY STUDY: CPT | Mod: 26

## 2025-06-07 PROCEDURE — 93970 EXTREMITY STUDY: CPT

## 2025-06-07 PROCEDURE — 99284 EMERGENCY DEPT VISIT MOD MDM: CPT | Mod: 25

## 2025-06-07 PROCEDURE — 99284 EMERGENCY DEPT VISIT MOD MDM: CPT

## 2025-06-07 PROCEDURE — 73630 X-RAY EXAM OF FOOT: CPT | Mod: 26,LT

## 2025-06-07 PROCEDURE — 73630 X-RAY EXAM OF FOOT: CPT | Mod: LT

## 2025-07-21 ENCOUNTER — APPOINTMENT (OUTPATIENT)
Facility: CLINIC | Age: 50
End: 2025-07-21
Payer: COMMERCIAL

## 2025-07-21 DIAGNOSIS — S86.312A STRAIN OF MUSCLE(S) AND TENDON(S) OF PERONEAL MUSCLE GROUP AT LOWER LEG LEVEL, LEFT LEG, INITIAL ENCOUNTER: ICD-10-CM

## 2025-07-21 PROCEDURE — 99204 OFFICE O/P NEW MOD 45 MIN: CPT

## 2025-07-21 RX ORDER — MELOXICAM 15 MG/1
15 TABLET ORAL
Qty: 30 | Refills: 2 | Status: ACTIVE | COMMUNITY
Start: 2025-07-21 | End: 1900-01-01

## 2025-08-28 ENCOUNTER — APPOINTMENT (OUTPATIENT)
Dept: ORTHOPEDIC SURGERY | Facility: CLINIC | Age: 50
End: 2025-08-28
Payer: COMMERCIAL

## 2025-08-28 DIAGNOSIS — S86.312A STRAIN OF MUSCLE(S) AND TENDON(S) OF PERONEAL MUSCLE GROUP AT LOWER LEG LEVEL, LEFT LEG, INITIAL ENCOUNTER: ICD-10-CM

## 2025-08-28 PROCEDURE — 99213 OFFICE O/P EST LOW 20 MIN: CPT

## 2025-09-15 ENCOUNTER — APPOINTMENT (OUTPATIENT)
Facility: CLINIC | Age: 50
End: 2025-09-15

## 2025-09-18 ENCOUNTER — APPOINTMENT (OUTPATIENT)
Dept: ORTHOPEDIC SURGERY | Facility: CLINIC | Age: 50
End: 2025-09-18